# Patient Record
Sex: MALE | Race: WHITE | Employment: FULL TIME | URBAN - METROPOLITAN AREA
[De-identification: names, ages, dates, MRNs, and addresses within clinical notes are randomized per-mention and may not be internally consistent; named-entity substitution may affect disease eponyms.]

---

## 2020-05-09 ENCOUNTER — HOSPITAL ENCOUNTER (EMERGENCY)
Facility: CLINIC | Age: 65
Discharge: HOME | End: 2020-05-09
Attending: EMERGENCY MEDICINE
Payer: MEDICARE

## 2020-05-09 ENCOUNTER — APPOINTMENT (OUTPATIENT)
Dept: GENERAL RADIOLOGY | Facility: CLINIC | Age: 65
End: 2020-05-09
Payer: MEDICARE

## 2020-05-09 VITALS
RESPIRATION RATE: 18 BRPM | DIASTOLIC BLOOD PRESSURE: 82 MMHG | SYSTOLIC BLOOD PRESSURE: 140 MMHG | TEMPERATURE: 98.2 F | HEIGHT: 66 IN | HEART RATE: 77 BPM | BODY MASS INDEX: 33.75 KG/M2 | WEIGHT: 210 LBS | OXYGEN SATURATION: 99 %

## 2020-05-09 PROCEDURE — 96372 THER/PROPH/DIAG INJ SC/IM: CPT

## 2020-05-09 PROCEDURE — 99284 EMERGENCY DEPT VISIT MOD MDM: CPT

## 2020-05-09 PROCEDURE — 6360000002 HC RX W HCPCS: Performed by: EMERGENCY MEDICINE

## 2020-05-09 PROCEDURE — 71045 X-RAY EXAM CHEST 1 VIEW: CPT

## 2020-05-09 RX ORDER — KETOROLAC TROMETHAMINE 10 MG/1
10 TABLET, FILM COATED ORAL EVERY 6 HOURS PRN
Qty: 20 TABLET | Refills: 0 | Status: SHIPPED | OUTPATIENT
Start: 2020-05-09

## 2020-05-09 RX ORDER — OMEPRAZOLE 20 MG/1
20 CAPSULE, DELAYED RELEASE ORAL DAILY
COMMUNITY

## 2020-05-09 RX ORDER — METOPROLOL TARTRATE 50 MG/1
50 TABLET, FILM COATED ORAL 2 TIMES DAILY
COMMUNITY

## 2020-05-09 RX ORDER — PREDNISONE 50 MG/1
50 TABLET ORAL DAILY
Qty: 4 TABLET | Refills: 0 | Status: SHIPPED | OUTPATIENT
Start: 2020-05-09 | End: 2020-05-13

## 2020-05-09 RX ORDER — ACETAMINOPHEN 500 MG
1000 TABLET ORAL DAILY
COMMUNITY

## 2020-05-09 RX ORDER — KETOROLAC TROMETHAMINE 30 MG/ML
30 INJECTION, SOLUTION INTRAMUSCULAR; INTRAVENOUS ONCE
Status: COMPLETED | OUTPATIENT
Start: 2020-05-09 | End: 2020-05-09

## 2020-05-09 RX ADMIN — KETOROLAC TROMETHAMINE 30 MG: 30 INJECTION, SOLUTION INTRAMUSCULAR at 17:05

## 2020-05-09 ASSESSMENT — PAIN SCALES - GENERAL
PAINLEVEL_OUTOF10: 3
PAINLEVEL_OUTOF10: 3
PAINLEVEL_OUTOF10: 8

## 2020-05-09 ASSESSMENT — ENCOUNTER SYMPTOMS
COUGH: 1
DIARRHEA: 0
VOMITING: 0
SHORTNESS OF BREATH: 0
SORE THROAT: 0

## 2020-05-09 ASSESSMENT — PAIN DESCRIPTION - LOCATION
LOCATION: HEAD
LOCATION: HEAD

## 2020-05-09 ASSESSMENT — PAIN DESCRIPTION - DESCRIPTORS: DESCRIPTORS: ACHING

## 2020-05-09 ASSESSMENT — PAIN DESCRIPTION - PAIN TYPE
TYPE: CHRONIC PAIN
TYPE: CHRONIC PAIN

## 2020-05-09 NOTE — ED PROVIDER NOTES
[facility]  [facility]  Phone: [facility]  EMERGENCY DEPARTMENT ENCOUNTER      Pt Name: Jack Schmitt  MRN: [id number]  [place] 1955  Date of evaluation: 5/9/2020  Provider: Yaakov Peña DO    CHIEF COMPLAINT       Chief Complaint   Patient presents with    Fatigue    Headache     3-4 days          HISTORY OF PRESENT ILLNESS   (Location/Symptom, Timing/Onset,Context/Setting, Quality, Duration, Modifying Factors, Severity)  Note limiting factors. Jack Schmitt is a 60 y.o. male who presents to the emergency department for the evaluation of not feeling well. Patient states it has been at least the last few days where he has been having fatigue and feeling \"headachey\". States that he drives 3 different vehicles and feels like he inhales a lot of exhaust.  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 Patient does have some concern about this, he is also had a mild intermittent cough that is nonproductive, no fever. Patient states that occasionally when he coughs he has some pain laterally on each side of the chest.  No central chest pain, no exertional chest pain and no shortness of breath. Patient does not smoke cigarettes. Patient states no abdominal pain, vomiting or diarrhea, does have a good appetite. Nursing Notes were reviewed. REVIEW OF SYSTEMS    (2-9systems for level 4, 10 or more for level 5)     Review of Systems   Constitutional: Negative for fever. HENT: Negative for sore throat. Respiratory: Positive for cough. Negative for shortness of breath. Cardiovascular: Negative for chest pain. Gastrointestinal: Negative for diarrhea and vomiting. Genitourinary: Negative for dysuria. Skin: Negative for rash. Neurological: Positive for headaches. Negative for weakness. All other systems reviewed and are negative.       Except asnoted above the remainder of the review of systems was reviewed and negative. PAST MEDICAL HISTORY     Past Medical History:   Diagnosis Date    Hypertension          SURGICAL HISTORY       Past Surgical History:   Procedure Laterality Date    PACEMAKER PLACEMENT      SHOULDER SURGERY           CURRENT MEDICATIONS     Previous Medications    ACETAMINOPHEN (TYLENOL) 500 MG TABLET    Take 1,000 mg by mouth daily    METOPROLOL TARTRATE (LOPRESSOR) 50 MG TABLET    Take 50 mg by mouth 2 times daily    OMEPRAZOLE (PRILOSEC) 20 MG DELAYED RELEASE CAPSULE    Take 20 mg by mouth daily    SPIRONOLACTONE-HCTZ PO    Take by mouth Dose unknown       ALLERGIES     Demerol hcl [meperidine] and Morphine    FAMILY HISTORY     History reviewed. No pertinent family history.        SOCIAL HISTORY       Social History     Socioeconomic History    Marital status: Single     Spouse name: None    Number of children: None    Years of education: None    Highest education level: None   Occupational History    None   Social Needs    Financial resource strain: None    Food insecurity     Worry: None     Inability: None    Transportation needs     Medical: None     Non-medical: None   Tobacco Use    Smoking status: None   Substance and Sexual Activity    Alcohol use: None    Drug use: None    Sexual activity: None   Lifestyle    Physical activity     Days per week: None     Minutes per session: None    Stress: None   Relationships    Social connections     Talks on phone: None     Gets together: None     Attends Yarsanism service: None     Active member of club or organization: None     Attends meetings of clubs or organizations: None     Relationship status: None    Intimate partner violence     Fear of current or ex partner: None     Emotionally abused: None     Physically abused: None     Forced sexual activity: None   Other Topics Concern    None   Social History Narrative    None       SCREENINGS             PHYSICAL EXAM    (up to 7 for level 4, DO Don         PROCEDURES:  Unless otherwise noted below, none     Procedures    FINAL IMPRESSION      1.  Cough    2. Other headache syndrome          DISPOSITION/PLAN   DISPOSITION Decision To Discharge 05/09/2020 04:43:54 PM      PATIENT REFERRED TO:  JULIANNA Copeland [facility]  [address] [place]  [phone]    In 1 week        DISCHARGE MEDICATIONS:  New Prescriptions    KETOROLAC (TORADOL) 10 MG TABLET    Take 1 tablet by mouth every 6 hours as needed for Pain    PREDNISONE (DELTASONE) 50 MG TABLET    Take 1 tablet by mouth daily for 4 days          (Please note that portions of this note were completed with a voice recognition program.  Efforts were made to edit the dictations but occasionally words are mis-transcribed.)    Jelani Moses DO (electronically signed)  Board Certified Emergency Physician         Jelani Moses DO  05/09/20 [address] [facility]   05/09/20 [id number]

## 2020-05-11 ENCOUNTER — CARE COORDINATION (OUTPATIENT)
Dept: CARE COORDINATION | Age: 65
End: 2020-05-11

## 2020-05-11 NOTE — CARE COORDINATION
Risa Novoa was seen at [facility] ER 2020- Fatigue and HA. He was given Toradol and Prednisone. Advised to f/u within 1 week with PCP.     2020- 10:11 am- Left message requesting return call. 2020- Spoke with Risa Novoa. He stated he is a lot better. Patient contacted regarding recent discharge and COVID-19 risk   Care Transition Nurse/ Ambulatory Care Manager contacted the patient by telephone to perform post discharge assessment. Verified name and  with patient as identifiers. Patient has following risk factors of: no known risk factors. CTN/ACM reviewed discharge instructions, medical action plan and red flags related to discharge diagnosis. Reviewed and educated them on any new and changed medications related to discharge diagnosis. Advised obtaining a 90-day supply of all daily and as-needed medications. Education provided regarding infection prevention, and signs and symptoms of COVID-19 and when to seek medical attention with patient who verbalized understanding. Discussed exposure protocols and quarantine from [address] you at higher risk for severe illness  and given an opportunity for questions and concerns. The patient agrees to contact the COVID-19 hotline [phone] or PCP office for questions related to their healthcare. CTN/ACM provided contact information for future reference. From CDC: Are you at higher risk for severe illness?  Wash your hands often.  Avoid close contact (6 feet, which is about two arm lengths) with people who are sick.  Put distance between yourself and other people if COVID-19 is spreading in your community.  Clean and disinfect frequently touched surfaces.  Avoid all cruise travel and non-essential air travel.  Call your healthcare professional if you have concerns about COVID-19 and your underlying condition or if you are sick.     For more information on steps you can take to protect yourself, see CDC's How to Protect

## 2020-05-22 ENCOUNTER — CARE COORDINATION (OUTPATIENT)
Dept: CARE COORDINATION | Age: 65
End: 2020-05-22

## 2021-03-04 ENCOUNTER — APPOINTMENT (OUTPATIENT)
Dept: CT IMAGING | Facility: CLINIC | Age: 66
End: 2021-03-04
Payer: MEDICARE

## 2021-03-04 ENCOUNTER — HOSPITAL ENCOUNTER (EMERGENCY)
Facility: CLINIC | Age: 66
Discharge: HOME | End: 2021-03-04
Attending: EMERGENCY MEDICINE
Payer: MEDICARE

## 2021-03-04 ENCOUNTER — APPOINTMENT (OUTPATIENT)
Dept: GENERAL RADIOLOGY | Facility: CLINIC | Age: 66
End: 2021-03-04
Payer: MEDICARE

## 2021-03-04 ENCOUNTER — APPOINTMENT (OUTPATIENT)
Dept: ULTRASOUND IMAGING | Facility: CLINIC | Age: 66
End: 2021-03-04
Payer: MEDICARE

## 2021-03-04 VITALS
BODY MASS INDEX: 32.95 KG/M2 | WEIGHT: 205 LBS | TEMPERATURE: 98.9 F | RESPIRATION RATE: 16 BRPM | SYSTOLIC BLOOD PRESSURE: 137 MMHG | HEIGHT: 66 IN | DIASTOLIC BLOOD PRESSURE: 84 MMHG | OXYGEN SATURATION: 97 % | HEART RATE: 67 BPM

## 2021-03-04 DIAGNOSIS — Z20.822 SUSPECTED COVID-19 VIRUS INFECTION: Primary | ICD-10-CM

## 2021-03-04 DIAGNOSIS — N50.89 SPERMATIC CORD MASS: ICD-10-CM

## 2021-03-04 LAB
ABSOLUTE EOS #: 0.1 K/UL (ref 0–0.4)
ABSOLUTE IMMATURE GRANULOCYTE: ABNORMAL K/UL (ref 0–0.3)
ABSOLUTE LYMPH #: 0.8 K/UL (ref 1–4.8)
ABSOLUTE MONO #: 0.9 K/UL (ref 0.1–1.2)
ALBUMIN SERPL-MCNC: 4.1 G/DL (ref 3.5–5.2)
ALBUMIN/GLOBULIN RATIO: 1.3 (ref 1–2.5)
ALP BLD-CCNC: 53 U/L (ref 40–129)
ALT SERPL-CCNC: 22 U/L (ref 5–41)
AMYLASE: 36 U/L (ref 28–100)
ANION GAP SERPL CALCULATED.3IONS-SCNC: 10 MMOL/L (ref 9–17)
AST SERPL-CCNC: 27 U/L
BASOPHILS # BLD: 1 % (ref 0–2)
BASOPHILS ABSOLUTE: 0.1 K/UL (ref 0–0.2)
BILIRUB SERPL-MCNC: 0.7 MG/DL (ref 0.3–1.2)
BUN BLDV-MCNC: 8 MG/DL (ref 8–23)
BUN/CREAT BLD: NORMAL (ref 9–20)
CALCIUM SERPL-MCNC: 9.1 MG/DL (ref 8.6–10.4)
CHLORIDE BLD-SCNC: 104 MMOL/L (ref 98–107)
CO2: 24 MMOL/L (ref 20–31)
CREAT SERPL-MCNC: 0.8 MG/DL (ref 0.7–1.2)
D-DIMER QUANTITATIVE: 0.71 MG/L FEU
DIFFERENTIAL TYPE: ABNORMAL
EOSINOPHILS RELATIVE PERCENT: 1 % (ref 1–4)
GFR AFRICAN AMERICAN: >60 ML/MIN
GFR NON-AFRICAN AMERICAN: >60 ML/MIN
GFR SERPL CREATININE-BSD FRML MDRD: NORMAL ML/MIN/{1.73_M2}
GFR SERPL CREATININE-BSD FRML MDRD: NORMAL ML/MIN/{1.73_M2}
GLUCOSE BLD-MCNC: 93 MG/DL (ref 70–99)
HCT VFR BLD CALC: 44.4 % (ref 41–53)
HEMOGLOBIN: 15.1 G/DL (ref 13.5–17.5)
IMMATURE GRANULOCYTES: ABNORMAL %
LIPASE: 19 U/L (ref 13–60)
LYMPHOCYTES # BLD: 14 % (ref 24–44)
MCH RBC QN AUTO: 30.1 PG (ref 26–34)
MCHC RBC AUTO-ENTMCNC: 33.9 G/DL (ref 31–37)
MCV RBC AUTO: 89 FL (ref 80–100)
MONOCYTES # BLD: 17 % (ref 2–11)
NRBC AUTOMATED: ABNORMAL PER 100 WBC
PDW BLD-RTO: 13.4 % (ref 12.5–15.4)
PLATELET # BLD: 166 K/UL (ref 140–450)
PLATELET ESTIMATE: ABNORMAL
PMV BLD AUTO: 9.1 FL (ref 6–12)
POTASSIUM SERPL-SCNC: 4 MMOL/L (ref 3.7–5.3)
RBC # BLD: 5 M/UL (ref 4.5–5.9)
RBC # BLD: ABNORMAL 10*6/UL
SEG NEUTROPHILS: 67 % (ref 36–66)
SEGMENTED NEUTROPHILS ABSOLUTE COUNT: 3.6 K/UL (ref 1.8–7.7)
SODIUM BLD-SCNC: 138 MMOL/L (ref 135–144)
TOTAL PROTEIN: 7.2 G/DL (ref 6.4–8.3)
TROPONIN INTERP: NORMAL
TROPONIN T: NORMAL NG/ML
TROPONIN, HIGH SENSITIVITY: 7 NG/L (ref 0–22)
WBC # BLD: 5.4 K/UL (ref 3.5–11)
WBC # BLD: ABNORMAL 10*3/UL

## 2021-03-04 PROCEDURE — 84484 ASSAY OF TROPONIN QUANT: CPT

## 2021-03-04 PROCEDURE — 76870 US EXAM SCROTUM: CPT

## 2021-03-04 PROCEDURE — 6370000000 HC RX 637 (ALT 250 FOR IP): Performed by: EMERGENCY MEDICINE

## 2021-03-04 PROCEDURE — 83690 ASSAY OF LIPASE: CPT

## 2021-03-04 PROCEDURE — 99285 EMERGENCY DEPT VISIT HI MDM: CPT

## 2021-03-04 PROCEDURE — 96374 THER/PROPH/DIAG INJ IV PUSH: CPT

## 2021-03-04 PROCEDURE — 85379 FIBRIN DEGRADATION QUANT: CPT

## 2021-03-04 PROCEDURE — 2580000003 HC RX 258: Performed by: EMERGENCY MEDICINE

## 2021-03-04 PROCEDURE — 82150 ASSAY OF AMYLASE: CPT

## 2021-03-04 PROCEDURE — 6360000004 HC RX CONTRAST MEDICATION: Performed by: EMERGENCY MEDICINE

## 2021-03-04 PROCEDURE — 93005 ELECTROCARDIOGRAM TRACING: CPT | Performed by: EMERGENCY MEDICINE

## 2021-03-04 PROCEDURE — 6360000002 HC RX W HCPCS: Performed by: EMERGENCY MEDICINE

## 2021-03-04 PROCEDURE — 74177 CT ABD & PELVIS W/CONTRAST: CPT

## 2021-03-04 PROCEDURE — 85025 COMPLETE CBC W/AUTO DIFF WBC: CPT

## 2021-03-04 PROCEDURE — 71260 CT THORAX DX C+: CPT

## 2021-03-04 PROCEDURE — U0003 INFECTIOUS AGENT DETECTION BY NUCLEIC ACID (DNA OR RNA); SEVERE ACUTE RESPIRATORY SYNDROME CORONAVIRUS 2 (SARS-COV-2) (CORONAVIRUS DISEASE [COVID-19]), AMPLIFIED PROBE TECHNIQUE, MAKING USE OF HIGH THROUGHPUT TECHNOLOGIES AS DESCRIBED BY CMS-2020-01-R: HCPCS

## 2021-03-04 PROCEDURE — 36415 COLL VENOUS BLD VENIPUNCTURE: CPT

## 2021-03-04 PROCEDURE — U0005 INFEC AGEN DETEC AMPLI PROBE: HCPCS

## 2021-03-04 PROCEDURE — 80053 COMPREHEN METABOLIC PANEL: CPT

## 2021-03-04 RX ORDER — ACETAMINOPHEN 325 MG/1
650 TABLET ORAL ONCE
Status: COMPLETED | OUTPATIENT
Start: 2021-03-04 | End: 2021-03-04

## 2021-03-04 RX ORDER — SODIUM CHLORIDE 0.9 % (FLUSH) 0.9 %
10 SYRINGE (ML) INJECTION PRN
Status: DISCONTINUED | OUTPATIENT
Start: 2021-03-04 | End: 2021-03-04 | Stop reason: HOSPADM

## 2021-03-04 RX ORDER — 0.9 % SODIUM CHLORIDE 0.9 %
70 INTRAVENOUS SOLUTION INTRAVENOUS ONCE
Status: COMPLETED | OUTPATIENT
Start: 2021-03-04 | End: 2021-03-04

## 2021-03-04 RX ORDER — DEXAMETHASONE 6 MG/1
6 TABLET ORAL DAILY
Qty: 5 TABLET | Refills: 0 | Status: SHIPPED | OUTPATIENT
Start: 2021-03-04 | End: 2021-03-09

## 2021-03-04 RX ORDER — KETOROLAC TROMETHAMINE 15 MG/ML
15 INJECTION, SOLUTION INTRAMUSCULAR; INTRAVENOUS ONCE
Status: COMPLETED | OUTPATIENT
Start: 2021-03-04 | End: 2021-03-04

## 2021-03-04 RX ORDER — 0.9 % SODIUM CHLORIDE 0.9 %
1000 INTRAVENOUS SOLUTION INTRAVENOUS ONCE
Status: COMPLETED | OUTPATIENT
Start: 2021-03-04 | End: 2021-03-04

## 2021-03-04 RX ADMIN — SODIUM CHLORIDE, PRESERVATIVE FREE 10 ML: 5 INJECTION INTRAVENOUS at 14:16

## 2021-03-04 RX ADMIN — IOPAMIDOL 75 ML: 755 INJECTION, SOLUTION INTRAVENOUS at 14:16

## 2021-03-04 RX ADMIN — SODIUM CHLORIDE 70 ML: 9 INJECTION, SOLUTION INTRAVENOUS at 14:16

## 2021-03-04 RX ADMIN — SODIUM CHLORIDE 1000 ML: 9 INJECTION, SOLUTION INTRAVENOUS at 13:31

## 2021-03-04 RX ADMIN — KETOROLAC TROMETHAMINE 15 MG: 15 INJECTION, SOLUTION INTRAMUSCULAR; INTRAVENOUS at 13:31

## 2021-03-04 RX ADMIN — ACETAMINOPHEN 650 MG: 325 TABLET ORAL at 15:10

## 2021-03-04 ASSESSMENT — PAIN DESCRIPTION - DESCRIPTORS: DESCRIPTORS: ACHING

## 2021-03-04 ASSESSMENT — PAIN SCALES - GENERAL
PAINLEVEL_OUTOF10: 3
PAINLEVEL_OUTOF10: 5
PAINLEVEL_OUTOF10: 6

## 2021-03-04 ASSESSMENT — PAIN DESCRIPTION - LOCATION: LOCATION: BACK

## 2021-03-04 NOTE — ED NOTES
Vanessa Ny MD at bedside for evaluation, to update patient on results and plan of care.       Contreras Gutierres RN  03/04/21 7284 Suture Removal: 14 days

## 2021-03-04 NOTE — ED PROVIDER NOTES
[address] [facility]      Pt Name: Calista Josue  MRN: [id number]  [place] 1955  Date of evaluation: 3/4/2021      CHIEF COMPLAINT       Chief Complaint   Patient presents with    Abdominal Pain    Fever    Cough    Headache         HISTORY OF PRESENT ILLNESS      The patient presents with abdominal pain, fever, cough and headache. His symptoms started about a day or so ago. The patient has a history of IBS. He was told to fill a prescription for an antibiotic for some foul-smelling diarrhea last week, but he has not picked up the prescription yet. He is not sure the name of the antibiotic. The patient thinks he could have COVID-19. He denies blood in his stool or emesis. He is having some epigastric discomfort. He has been taking his antiacids as directed. He said he led a little bit of pain in the left lower chest this morning as well. The patient is not having any diaphoresis. He reports no rash. He works as a . Nothing makes his symptoms better or worse otherwise. REVIEW OF SYSTEMS       All systems reviewed and negative unless noted in HPI. The patient reports a fever. Denies vision change. Denies any sore throat or rhinorrhea. Denies any neck pain or stiffness. Reports cough and pain in the left anterior chest this morning. Anuradha Libman Epigastric abdominal pain. History of IBS. Denies any dysuria. Denies urinary frequency or hematuria. Denies musculoskeletal injury or pain. Denies any weakness, numbness or focal neurologic deficit. Reports headache involving his whole head. Denies any skin rash or edema. No recent psychiatric issues. No easy bruising or bleeding. Denies any polyuria, polydypsia or history of immunocompromise. PAST MEDICAL HISTORY    has a past medical history of Hypertension.     SURGICAL HISTORY has a past surgical history that includes pacemaker placement and shoulder surgery. CURRENT MEDICATIONS       Previous Medications    ACETAMINOPHEN (TYLENOL) 500 MG TABLET    Take 1,000 mg by mouth daily    KETOROLAC (TORADOL) 10 MG TABLET    Take 1 tablet by mouth every 6 hours as needed for Pain    METOPROLOL TARTRATE (LOPRESSOR) 50 MG TABLET    Take 50 mg by mouth 2 times daily    OMEPRAZOLE (PRILOSEC) 20 MG DELAYED RELEASE CAPSULE    Take 20 mg by mouth daily    SPIRONOLACTONE-HCTZ PO    Take by mouth Dose unknown       ALLERGIES     is allergic to demerol hcl [meperidine] and morphine. FAMILY HISTORY     has no family status information on file. family history is not on file. SOCIAL HISTORY          PHYSICAL EXAM     INITIAL VITALS:  height is 5' 6\" (1.676 m) and weight is 93 kg (205 lb). His oral temperature is 98.9 °F (37.2 °C). His blood pressure is 149/85 (abnormal) and his pulse is 62. His respiration is 15 and oxygen saturation is 99%. The patient is alert and oriented, in no apparent distress. HEENT is atraumatic. Pupils are PERRL at 4 mm. Mucous membranes moist.    Neck is supple. Heart sounds regular rate and rhythm with no gallops, murmurs, or rubs. Pacemaker in anterior chest.  Lungs clear, no wheezes, rales or rhonchi. Abdomen: soft, nontender with no pain to palpation. No pulsatile mass. Normal bowel sounds are noted. No rebound or guarding. Musculoskeletal exam shows no evidence of trauma. Normal distal pulses in all extremities. Skin: no rash or edema. Neurological exam reveals cranial nerves 2 through 12 grossly intact. Patient has equal  and normal deep tendon reflexes. Psychiatric: no hallucinations or suicidal ideation. Lymphatics.:  No lymphadenopathy.          DIFFERENTIAL DIAGNOSIS/ MDM:     Covid 19, dehydration, ACS, pneumonia, PE, gastritis    DIAGNOSTIC RESULTS EKG: All EKG's are interpreted by the Emergency Department Physician who either signs or Co-signs this chart in the absence of a cardiologist.    Atrial paced at 66 with narrow complex and no ischemia. Axis -33, , QRS 84, . No old to compare. RADIOLOGY:   I reviewed the radiologist interpretations:  [place]   Final Result   Testicles are normal in appearance with normal Doppler flow. 2.2 x 1.0 x 1.4 cm irregular, hypoechoic soft tissue mass in the left   inguinal canal which appears to be associated with the spermatic cord. Imaging features are nonspecific, but do not appear to represent a spermatic   cord lipoma or hydrocele. Other differential considerations include   mesenchymal tumors, both benign and malignant. Consideration should be made   for contrast-enhanced MRI on a routine basis to attempt to further   characterize, and Urology consultation is advised. CT CHEST PULMONARY EMBOLISM W CONTRAST   Final Result   No acute or chronic pulmonary embolism. Bilateral atelectasis. CT ABDOMEN PELVIS W IV CONTRAST Additional Contrast? None   Final Result   2.1 x 1.5 cm soft tissue density structure in the left inguinal canal.  The   testicle is seen within the scrotum and there is no evidence for hernia. Scrotal ultrasound to include the left inguinal canal region is recommended   for further evaluation. Fatty infiltration of the liver. Prostatomegaly                US SCROTUM W LIMITED DUPLEX (Final result)  Result time 03/04/21 16:08:12  Procedure changed from [address]  Final result by Andrea Henriquez DO (03/04/21 16:08:12)                Impression:    Testicles are normal in appearance with normal Doppler flow. 2.2 x 1.0 x 1.4 cm irregular, hypoechoic soft tissue mass in the left   inguinal canal which appears to be associated with the spermatic cord. Imaging features are nonspecific, but do not appear to represent a spermatic   cord lipoma or hydrocele.  Other differential considerations include   mesenchymal tumors, both benign and malignant.  Consideration should be made   for contrast-enhanced MRI on a routine basis to attempt to further   characterize, and Urology consultation is advised. Narrative:    EXAMINATION:   ULTRASOUND OF THE SCROTUM/TESTICLES WITH COLOR DOPPLER FLOW EVALUATION     3/4/2021     COMPARISON:   CT performed earlier today     HISTORY:   ORDERING SYSTEM PROVIDED HISTORY: left inguinal mass   TECHNOLOGIST PROVIDED HISTORY:   left inguinal mass   Reason for Exam: Follow up to CT for abnormal findings, Left inguinal mass   Acuity: Unknown   Type of Exam: Subsequent/Follow-up     FINDINGS:     Measurements:     Right testicle: 4.4 x 2.6 x 2.9 cm     Right epididymal head: 0.7 cm     Left testicle: 4.3 x 2.5 x 3.0 cm     Left epididymal head: 0.8 cm a       Right:     Grey scale:  The right testicle demonstrates normal homogeneous echotexture   without focal lesion.  No evidence of testicular microlithiasis. Doppler Evaluation:  There is normal arterial and venous Doppler flow within   the testicle. Scrotal Sac:  No evidence of hydrocele. Epididymis:  No acute abnormality. Left:     Grey scale:  The left testicle demonstrates normal homogeneous echotexture   without focal lesion.  No evidence of testicular microlithiasis. Doppler Evaluation:  There is normal arterial and venous Doppler flow within   the testicle. Scrotal Sac:  No evidence of hydrocele, varicocele, or hernia.  2.2 x 1.0 x   1.4 cm irregular, hypoechoic soft tissue mass in the left inguinal canal with   internal vascularity corresponding to the finding on prior CT.      Epididymis:  No acute abnormality.                     CT CHEST PULMONARY EMBOLISM W CONTRAST (Final result)  Result time 03/04/21 14:42:43 Procedure changed from [address] [place] [address]  Final result by Kel Fabian MD (03/04/21 14:42:43)                Impression:    No acute or chronic pulmonary embolism. Bilateral atelectasis. Narrative:    EXAMINATION:   CTA OF THE CHEST 3/4/2021 2:09 pm     TECHNIQUE:   CTA of the chest was performed after the administration of intravenous   contrast.  Multiplanar reformatted images are provided for review.  MIP   images are provided for review. Dose modulation, iterative reconstruction,   and/or weight based adjustment of the mA/kV was utilized to reduce the   radiation dose to as low as reasonably achievable. COMPARISON:   None. HISTORY:   ORDERING SYSTEM PROVIDED HISTORY: pain; elevated d-dimer; covid   TECHNOLOGIST PROVIDED HISTORY:   pain; elevated d-dimer; covid   Reason for Exam: elevated D-Dimer   Acuity: Acute   Type of Exam: Initial   Additional signs and symptoms: cough, fever, headache, and epigastric   abdominal pain for the past couple days   Relevant Medical/Surgical History: hypertension and pacemaker placement     FINDINGS:   Pulmonary Arteries: Pulmonary arteries are adequately opacified for   evaluation.  No evidence of intraluminal filling defect to suggest pulmonary   embolism.  Main pulmonary artery is normal in caliber. Mediastinum: No evidence of mediastinal lymphadenopathy.  The heart and   pericardium demonstrate no acute abnormality.  There is no acute abnormality   of the thoracic aorta. Lungs/pleura: The lungs are without acute process.  There is bilateral   atelectasis.  No focal consolidation or pulmonary edema.  No evidence of   pleural effusion or pneumothorax. Upper Abdomen: Limited images of the upper abdomen are unremarkable.      Soft Tissues/Bones: No acute bone or soft tissue abnormality.                     CT ABDOMEN PELVIS W IV CONTRAST Additional Contrast? None (Final result)  Result time 03/04/21 14:54:37 Final result by Eva Hampton MD (03/04/21 14:54:37)                Impression:    2.1 x 1.5 cm soft tissue density structure in the left inguinal canal.  The   testicle is seen within the scrotum and there is no evidence for hernia. Scrotal ultrasound to include the left inguinal canal region is recommended   for further evaluation. Fatty infiltration of the liver. Prostatomegaly             Narrative:    EXAMINATION:   CT OF THE ABDOMEN AND PELVIS WITH CONTRAST 3/4/2021 2:10 pm     TECHNIQUE:   CT of the abdomen and pelvis was performed with the administration of   intravenous contrast. Multiplanar reformatted images are provided for review. Dose modulation, iterative reconstruction, and/or weight based adjustment of   the mA/kV was utilized to reduce the radiation dose to as low as reasonably   achievable. COMPARISON:   None. HISTORY:   ORDERING SYSTEM PROVIDED HISTORY: pain   TECHNOLOGIST PROVIDED HISTORY:   pain     Decision Support Exception->Emergency Medical Condition (MA)   Reason for Exam: epigastric abdominal pain   Acuity: Acute   Type of Exam: Initial   Additional signs and symptoms: cough, fever, and headache over the past   couple days   Relevant Medical/Surgical History: IBS     FINDINGS:   Lower Chest: Please refer to the chest CT report     Organs: There is mild fatty infiltration of the liver.  The gallbladder,   kidneys, spleen, pancreas, adrenal glands are normal     GI/Bowel: There is a moderate to large amount of stool seen throughout the   colon. Pelvis: The bladder is unremarkable in appearance.  The prostate gland is   enlarged measuring 6 x 5 cm in size. Peritoneum/Retroperitoneum: Calcific atherosclerotic disease in the aorta. No retroperitoneal lymphadenopathy is seen. Bones/Soft Tissues: There is a 2.1 x 1.5 cm soft tissue density structure in   the left inguinal region.                  LABS: Results for orders placed or performed during the hospital encounter of 03/04/21   CBC Auto Differential   Result Value Ref Range    WBC 5.4 3.5 - 11.0 k/uL    RBC 5.00 4.5 - 5.9 m/uL    Hemoglobin 15.1 13.5 - 17.5 g/dL    Hematocrit 44.4 41 - 53 %    MCV 89.0 80 - 100 fL    MCH 30.1 26 - 34 pg    MCHC 33.9 31 - 37 g/dL    RDW 13.4 12.5 - 15.4 %    Platelets 589 012 - 857 k/uL    MPV 9.1 6.0 - 12.0 fL    NRBC Automated NOT REPORTED per 100 WBC    Differential Type NOT REPORTED     Seg Neutrophils 67 (H) 36 - 66 %    Lymphocytes 14 (L) 24 - 44 %    Monocytes 17 (H) 2 - 11 %    Eosinophils % 1 1 - 4 %    Basophils 1 0 - 2 %    Immature Granulocytes NOT REPORTED 0 %    Segs Absolute 3.60 1.8 - 7.7 k/uL    Absolute Lymph # 0.80 (L) 1.0 - 4.8 k/uL    Absolute Mono # 0.90 0.1 - 1.2 k/uL    Absolute Eos # 0.10 0.0 - 0.4 k/uL    Basophils Absolute 0.10 0.0 - 0.2 k/uL    Absolute Immature Granulocyte NOT REPORTED 0.00 - 0.30 k/uL    WBC Morphology NOT REPORTED     RBC Morphology NOT REPORTED     Platelet Estimate NOT REPORTED    Comprehensive Metabolic Panel   Result Value Ref Range    Glucose 93 70 - 99 mg/dL    BUN 8 8 - 23 mg/dL    CREATININE 0.80 0.70 - 1.20 mg/dL    Bun/Cre Ratio NOT REPORTED 9 - 20    Calcium 9.1 8.6 - 10.4 mg/dL    Sodium 138 135 - 144 mmol/L    Potassium 4.0 3.7 - 5.3 mmol/L    Chloride 104 98 - 107 mmol/L    CO2 24 20 - 31 mmol/L    Anion Gap 10 9 - 17 mmol/L    Alkaline Phosphatase 53 40 - 129 U/L    ALT 22 5 - 41 U/L    AST 27 <40 U/L    Total Bilirubin 0.70 0.3 - 1.2 mg/dL    Total Protein 7.2 6.4 - 8.3 g/dL    Albumin 4.1 3.5 - 5.2 g/dL    Albumin/Globulin Ratio 1.3 1.0 - 2.5    GFR Non-African American >60 >60 mL/min    GFR African American >60 >60 mL/min    GFR Comment          GFR Staging NOT REPORTED    Lipase   Result Value Ref Range    Lipase 19 13 - 60 U/L   Amylase   Result Value Ref Range    Amylase 36 28 - 100 U/L   Troponin   Result Value Ref Range Troponin, High Sensitivity 7 0 - 22 ng/L    Troponin T NOT REPORTED <0.03 ng/mL    Troponin Interp NOT REPORTED    D-Dimer, Quantitative   Result Value Ref Range    D-Dimer, Quant 0.71 mg/L FEU         EMERGENCY DEPARTMENT COURSE:   Vitals:    Vitals:    03/04/21 1312 03/04/21 1335 03/04/21 1444   BP: (!) 162/87 138/78 (!) 149/85   Pulse: 72 79 62   Resp: 20 18 15   Temp: 98.9 °F (37.2 °C)     TempSrc: Oral     SpO2: 99% 96% 99%   Weight: 93 kg (205 lb)     Height: 5' 6\" (1.676 m)       -------------------------  BP: (!) 149/85, Temp: 98.9 °F (37.2 °C), Pulse: 62, Resp: 15      Re-evaluation Notes    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 I explained that he does need to get this checked out to make sure is not a cancerous growth. I have given him referral to the urologist.  The patient likely has a COVID-19 infection. He is advised to isolate at home. A nonrapid test was obtained. The patient has no evidence of ACS, PE, or pneumonia. He has no surgical GI presentation. He is discharged in good condition. FINAL IMPRESSION      1. Suspected COVID-19 virus infection    2.  Spermatic cord mass          DISPOSITION/PLAN   DISPOSITION        Condition on Disposition    good    PATIENT REFERRED TO:  Emily Kowalski MD  [address]  [address] [facility] [address] [address]  [phone]    In 2 days        DISCHARGE MEDICATIONS:  New Prescriptions    No medications on file       (Please note that portions of this note were completed with a voice recognition program.  Efforts were made to edit the dictations but occasionally words are mis-transcribed.)    Raza MD   Attending Emergency Physician       Floyd Ash MD  03/04/21 4920

## 2021-03-05 ENCOUNTER — CARE COORDINATION (OUTPATIENT)
Dept: FAMILY MEDICINE CLINIC | Age: 66
End: 2021-03-05

## 2021-03-05 LAB
EKG ATRIAL RATE: 66 BPM
EKG P AXIS: 25 DEGREES
EKG P-R INTERVAL: 150 MS
EKG Q-T INTERVAL: 368 MS
EKG QRS DURATION: 84 MS
EKG QTC CALCULATION (BAZETT): 385 MS
EKG R AXIS: -33 DEGREES
EKG T AXIS: 5 DEGREES
EKG VENTRICULAR RATE: 66 BPM
SARS-COV-2: ABNORMAL
SARS-COV-2: DETECTED
SOURCE: ABNORMAL

## 2021-03-05 NOTE — CARE COORDINATION
ACM attempted outreach for ED follow up, viral symptoms, COVID 19 Protocol    No answer and LVM requesting call back for updates, COVID 19 prevention/precaution    COVID 19 testing +

## 2021-07-24 ENCOUNTER — HOSPITAL ENCOUNTER (EMERGENCY)
Facility: CLINIC | Age: 66
Discharge: HOME | End: 2021-07-24
Attending: EMERGENCY MEDICINE
Payer: MEDICARE

## 2021-07-24 VITALS
SYSTOLIC BLOOD PRESSURE: 153 MMHG | RESPIRATION RATE: 14 BRPM | OXYGEN SATURATION: 98 % | BODY MASS INDEX: 32.14 KG/M2 | DIASTOLIC BLOOD PRESSURE: 100 MMHG | WEIGHT: 200 LBS | HEIGHT: 66 IN | HEART RATE: 71 BPM | TEMPERATURE: 98.1 F

## 2021-07-24 DIAGNOSIS — A60.02 HERPESVIRAL INFECTION OF OTHER MALE GENITAL ORGANS: ICD-10-CM

## 2021-07-24 DIAGNOSIS — N39.0 URINARY TRACT INFECTION WITHOUT HEMATURIA, SITE UNSPECIFIED: Primary | ICD-10-CM

## 2021-07-24 LAB
-: ABNORMAL
AMORPHOUS: ABNORMAL
BACTERIA: ABNORMAL
BILIRUBIN URINE: NEGATIVE
CASTS UA: ABNORMAL /LPF (ref 0–2)
COLOR: YELLOW
COMMENT UA: ABNORMAL
CRYSTALS, UA: ABNORMAL /HPF
EPITHELIAL CELLS UA: ABNORMAL /HPF (ref 0–5)
GLUCOSE URINE: NEGATIVE
KETONES, URINE: NEGATIVE
LEUKOCYTE ESTERASE, URINE: ABNORMAL
MUCUS: ABNORMAL
NITRITE, URINE: NEGATIVE
OTHER OBSERVATIONS UA: ABNORMAL
PH UA: 5.5 (ref 5–8)
PROTEIN UA: NEGATIVE
RBC UA: ABNORMAL /HPF (ref 0–2)
RENAL EPITHELIAL, UA: ABNORMAL /HPF
SPECIFIC GRAVITY UA: 1.01 (ref 1–1.03)
TRICHOMONAS: ABNORMAL
TURBIDITY: ABNORMAL
URINE HGB: ABNORMAL
UROBILINOGEN, URINE: NORMAL
WBC UA: ABNORMAL /HPF (ref 0–5)
YEAST: ABNORMAL

## 2021-07-24 PROCEDURE — 99282 EMERGENCY DEPT VISIT SF MDM: CPT

## 2021-07-24 PROCEDURE — 81001 URINALYSIS AUTO W/SCOPE: CPT

## 2021-07-24 PROCEDURE — 87086 URINE CULTURE/COLONY COUNT: CPT

## 2021-07-24 RX ORDER — LEVOFLOXACIN 500 MG/1
500 TABLET, FILM COATED ORAL DAILY
Qty: 10 TABLET | Refills: 0 | Status: SHIPPED | OUTPATIENT
Start: 2021-07-24 | End: 2021-08-03

## 2021-07-24 RX ORDER — VALACYCLOVIR HYDROCHLORIDE 1 G/1
1000 TABLET, FILM COATED ORAL 2 TIMES DAILY
Qty: 20 TABLET | Refills: 0 | Status: SHIPPED | OUTPATIENT
Start: 2021-07-24 | End: 2021-08-03

## 2021-07-24 ASSESSMENT — PAIN SCALES - GENERAL: PAINLEVEL_OUTOF10: 2

## 2021-07-24 ASSESSMENT — PAIN DESCRIPTION - LOCATION: LOCATION: HEAD

## 2021-07-24 ASSESSMENT — PAIN DESCRIPTION - FREQUENCY: FREQUENCY: CONTINUOUS

## 2021-07-24 NOTE — ED PROVIDER NOTES
[address] [place] [address] ED  15 [facility]  Phone: 50 Rak[address] Squires      Pt Name: Luis M Aly  MRN: [id number]  [place] 1955  Date of evaluation: 7/24/2021    CHIEF COMPLAINT       Chief Complaint   Patient presents with    Urinary Tract Infection     has had them in the past        HISTORY OF PRESENT ILLNESS    Luis M Aly is a 73 y.o. male who presents with a recurrence of a urinary tract infection. The patient states that he is recently seen a urologist in the urologist called him today he does state that his urine was positive for infection and urged him to come to the emergency department. Seen by [facility]. Patient also states that he has an area of herpes involving his scrotum that he states sometimes flares up and is requesting that I make an exam of this. REVIEW OF SYSTEMS     Constitutional: No fevers or chills   HEENT: No sore throat, rhinorrhea, or earache   Eyes: No blurry vision or double vision no drainage   Cardiovascular: No chest pain or tachycardia   Respiratory: No wheezing or shortness of breath no cough   Gastrointestinal: No nausea, vomiting, diarrhea, constipation, or abdominal pain   : See above  Musculoskeletal: No swelling or pain   Skin: No rash   Neurological: No focal neurologic complaints, paresthesias, weakness, or headache   PAST MEDICAL HISTORY    has a past medical history of Hypertension. SURGICAL HISTORY      has a past surgical history that includes pacemaker placement; shoulder surgery; and knee surgery.     CURRENT MEDICATIONS       Previous Medications    ACETAMINOPHEN (TYLENOL) 500 MG TABLET    Take 1,000 mg by mouth daily    KETOROLAC (TORADOL) 10 MG TABLET    Take 1 tablet by mouth every 6 hours as needed for Pain    METOPROLOL TARTRATE (LOPRESSOR) 50 MG TABLET    Take 50 mg by mouth 2 times daily    OMEPRAZOLE (PRILOSEC) 20 MG DELAYED RELEASE CAPSULE    Take 20 mg by mouth daily SPIRONOLACTONE-HCTZ PO    Take by mouth Dose unknown       ALLERGIES     is allergic to demerol hcl [meperidine] and morphine. FAMILY HISTORY     has no family status information on file. family history is not on file. SOCIAL HISTORY      reports that he has never smoked. He has never used smokeless tobacco. He reports previous drug use. Drug: Marijuana. He reports that he does not drink alcohol. PHYSICAL EXAM       ED Triage Vitals   BP Temp Temp src Pulse Resp SpO2 Height Weight   -- -- -- -- -- -- -- --     Constitutional: Alert, oriented x3, nontoxic, answering questions appropriately, acting properly for age, in no acute distress   HEENT: Extraocular muscles intact, mucus membranes moist, TMs clear bilaterally, no posterior pharyngeal erythema or exudates, Pupils equal, round, reactive to light,   Neck: Trachea midline   Cardiovascular: Regular rhythm and rate no S3, S4, or murmurs   Respiratory: Clear to auscultation bilaterally no wheezes, rhonchi, rales, no respiratory distress no tachypnea no retractions no hypoxia  Gastrointestinal: Soft, nontender, nondistended, positive bowel sounds. No rebound, rigidity, or guarding.    Musculoskeletal: No extremity pain or swelling   Neurologic: Moving all 4 extremities without difficulty there are no gross focal neurologic deficits   Skin: Warm and dry   DIFFERENTIAL DIAGNOSIS/ MDM:         DIAGNOSTIC RESULTS     EKG: All EKG's are interpreted by the Emergency Department Physician who either signs or Co-signs this chart in the absence of a cardiologist.        Not indicated unless otherwise documented above    LABS:  Results for orders placed or performed during the hospital encounter of 07/24/21   Urinalysis Reflex to Culture    Specimen: Urine, clean catch   Result Value Ref Range    Color, UA YELLOW YELLOW    Turbidity UA SLIGHTLY CLOUDY (A) CLEAR    Glucose, Ur NEGATIVE NEGATIVE    Bilirubin Urine NEGATIVE NEGATIVE    Ketones, Urine NEGATIVE NEGATIVE Specific Gravity, UA 1.010 1.005 - 1.030    Urine Hgb LARGE (A) NEGATIVE    pH, UA 5.5 5.0 - 8.0    Protein, UA NEGATIVE NEGATIVE    Urobilinogen, Urine Normal Normal    Nitrite, Urine NEGATIVE NEGATIVE    Leukocyte Esterase, Urine MODERATE (A) NEGATIVE    Urinalysis Comments NOT REPORTED    Microscopic Urinalysis   Result Value Ref Range    -          WBC, UA TOO NUMEROUS TO COUNT 0 - 5 /HPF    RBC, UA 50  0 - 2 /HPF    Casts UA NOT REPORTED 0 - 2 /LPF    Crystals, UA NOT REPORTED None /HPF    Epithelial Cells UA 0 TO 2 0 - 5 /HPF    Renal Epithelial, UA NOT REPORTED 0 /HPF    Bacteria, UA FEW (A) None    Mucus, UA NOT REPORTED None    Trichomonas, UA NOT REPORTED None    Amorphous, UA NOT REPORTED None    Other Observations UA Culture ordered based on defined criteria. (A) NOT REQ. Yeast, UA NOT REPORTED None       Not indicated unless otherwise documented above    RADIOLOGY:   I reviewed the radiologist interpretations:    No orders to display       Not indicated unless otherwise documented above    EMERGENCY DEPARTMENT COURSE:     The patient was given the following medications:  Orders Placed This Encounter   Medications    levoFLOXacin (LEVAQUIN) 500 MG tablet     Sig: Take 1 tablet by mouth daily for 10 days     Dispense:  10 tablet     Refill:  0    valACYclovir (VALTREX) 1 g tablet     Sig: Take 1 tablet by mouth 2 times daily for 10 days     Dispense:  20 tablet     Refill:  0        Vitals:   -------------------------  BP (!) 153/100   Pulse 71   Temp 98.1 °F (36.7 °C) (Oral)   Resp 14   Ht 5' 6\" (1.676 m)   Wt 90.7 kg (200 lb)   SpO2 98%   BMI 32.28 kg/m²         I have reviewed the disposition diagnosis with the patient and or their family/guardian. I have answered their questions and given discharge instructions. They voiced understanding of these instructions and did not have any furtherquestions or complaints.     CRITICAL CARE:    None    CONSULTS:    None    PROCEDURES:    None      OARRS Report if indicated             FINAL IMPRESSION      1. Urinary tract infection without hematuria, site unspecified    2.  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  Efforts were made to edit the dictations but occasionally words are mis-transcribed.)    Arturo Quintero MD,, MD  Attending Emergency Physician        Arturo Quintero MD  07/24/21 3012

## 2021-07-25 LAB
CULTURE: NORMAL
Lab: NORMAL
SPECIMEN DESCRIPTION: NORMAL

## 2021-12-10 ENCOUNTER — HOSPITAL ENCOUNTER (EMERGENCY)
Facility: CLINIC | Age: 66
Discharge: HOME | End: 2021-12-10
Attending: EMERGENCY MEDICINE
Payer: MEDICARE

## 2021-12-10 VITALS
BODY MASS INDEX: 31.02 KG/M2 | HEIGHT: 66 IN | DIASTOLIC BLOOD PRESSURE: 83 MMHG | RESPIRATION RATE: 16 BRPM | SYSTOLIC BLOOD PRESSURE: 146 MMHG | HEART RATE: 81 BPM | OXYGEN SATURATION: 98 % | TEMPERATURE: 98.1 F | WEIGHT: 193 LBS

## 2021-12-10 DIAGNOSIS — J06.9 ACUTE UPPER RESPIRATORY INFECTION: Primary | ICD-10-CM

## 2021-12-10 DIAGNOSIS — Z20.822 SUSPECTED COVID-19 VIRUS INFECTION: ICD-10-CM

## 2021-12-10 LAB
DIRECT EXAM: NORMAL
DIRECT EXAM: NORMAL
Lab: NORMAL
Lab: NORMAL
SPECIMEN DESCRIPTION: NORMAL
SPECIMEN DESCRIPTION: NORMAL

## 2021-12-10 PROCEDURE — U0005 INFEC AGEN DETEC AMPLI PROBE: HCPCS

## 2021-12-10 PROCEDURE — U0003 INFECTIOUS AGENT DETECTION BY NUCLEIC ACID (DNA OR RNA); SEVERE ACUTE RESPIRATORY SYNDROME CORONAVIRUS 2 (SARS-COV-2) (CORONAVIRUS DISEASE [COVID-19]), AMPLIFIED PROBE TECHNIQUE, MAKING USE OF HIGH THROUGHPUT TECHNOLOGIES AS DESCRIBED BY CMS-2020-01-R: HCPCS

## 2021-12-10 PROCEDURE — 99283 EMERGENCY DEPT VISIT LOW MDM: CPT

## 2021-12-10 PROCEDURE — 87880 STREP A ASSAY W/OPTIC: CPT

## 2021-12-10 PROCEDURE — 87804 INFLUENZA ASSAY W/OPTIC: CPT

## 2021-12-10 RX ORDER — LORATADINE 10 MG
1 CAPSULE ORAL EVERY 4 HOURS PRN
Qty: 30 CAPSULE | Refills: 0 | Status: SHIPPED | OUTPATIENT
Start: 2021-12-10

## 2021-12-10 RX ORDER — PREDNISONE 10 MG/1
TABLET ORAL
Qty: 20 TABLET | Refills: 0 | Status: SHIPPED | OUTPATIENT
Start: 2021-12-10 | End: 2021-12-20

## 2021-12-10 ASSESSMENT — PAIN DESCRIPTION - PAIN TYPE: TYPE: ACUTE PAIN

## 2021-12-10 ASSESSMENT — PAIN DESCRIPTION - FREQUENCY: FREQUENCY: CONTINUOUS

## 2021-12-10 ASSESSMENT — PAIN DESCRIPTION - ONSET: ONSET: ON-GOING

## 2021-12-10 ASSESSMENT — PAIN SCALES - GENERAL: PAINLEVEL_OUTOF10: 3

## 2021-12-10 ASSESSMENT — PAIN DESCRIPTION - LOCATION: LOCATION: HEAD

## 2021-12-10 ASSESSMENT — PAIN DESCRIPTION - PROGRESSION: CLINICAL_PROGRESSION: NOT CHANGED

## 2021-12-10 ASSESSMENT — PAIN DESCRIPTION - DESCRIPTORS: DESCRIPTORS: ACHING

## 2021-12-10 NOTE — ED TRIAGE NOTES
Pt to ED with c/o a headache and a sore throat. Pt reports these symptoms started this past Monday or Tuesday. Pt denies any other complaints at this time. Pt sitting in chair. RR even and non labored. NAD noted at this time. Call light within reach.

## 2021-12-10 NOTE — ED PROVIDER NOTES
[address] [facility]      Pt Name: Bartolo Mckenzie  MRN: [id number]  [place] 1955  Date of evaluation: 12/10/2021      CHIEF COMPLAINT       Chief Complaint   Patient presents with    Pharyngitis     PCP would not prescribe anything until tested for covid.  Headache         HISTORY OF PRESENT ILLNESS      The patient presents with sore throat and headache for the past week. The patient called his PCP but they would not prescribe anything without a Covid test.  The patient reports cough productive of clear sputum. He cannot take a lot of decongestants because of his hypertension. He reports throat discomfort but he is able to drink fluids and to breathe. He does not have a history of smoking. He is not immunized against Covid. The patient denies vomiting or diarrhea. He has had some intermittent chills but no fever. Nothing makes his symptoms better or worse otherwise. REVIEW OF SYSTEMS       All systems reviewed and negative unless noted in HPI. The patient reports occasional chills. Denies vision change. Patient reports sore throat. .    Denies any neck pain or stiffness. Denies chest pain or shortness of breath. Cough occasionally productive of clear sputum. No nausea,  vomiting or diarrhea. Denies any dysuria. Denies urinary frequency or hematuria. Denies musculoskeletal injury or pain. Denies any weakness, numbness or focal neurologic deficit. Reports I headache in his whole head. Denies any skin rash or edema. No recent psychiatric issues. No easy bruising or bleeding. Denies any polyuria, polydypsia or history of immunocompromise. PAST MEDICAL HISTORY    has a past medical history of Hypertension. SURGICAL HISTORY      has a past surgical history that includes pacemaker placement; shoulder surgery; knee surgery; and Colonoscopy.     CURRENT MEDICATIONS       Discharge Medication List as of 12/10/2021 6:56 PM      CONTINUE these medications which have NOT CHANGED    Details   SPIRONOLACTONE-HCTZ PO Take by mouth Dose unknownHistorical Med      metoprolol tartrate (LOPRESSOR) 50 MG tablet Take 50 mg by mouth 2 times dailyHistorical Med      omeprazole (PRILOSEC) 20 MG delayed release capsule Take 20 mg by mouth dailyHistorical Med      acetaminophen (TYLENOL) 500 MG tablet Take 1,000 mg by mouth dailyHistorical Med      ketorolac (TORADOL) 10 MG tablet Take 1 tablet by mouth every 6 hours as needed for Pain, Disp-20 tablet, R-0Print             ALLERGIES     is allergic to demerol hcl [meperidine] and morphine. FAMILY HISTORY     has no family status information on file. family history is not on file. SOCIAL HISTORY      reports that he has never smoked. He has never used smokeless tobacco. He reports previous drug use. Drug: Marijuana Berneta Marquis). He reports that he does not drink alcohol. PHYSICAL EXAM     INITIAL VITALS:  height is 5' 6\" (1.676 m) and weight is 87.5 kg (193 lb). His oral temperature is 98.1 °F (36.7 °C). His blood pressure is 146/83 (abnormal) and his pulse is 81. His respiration is 16 and oxygen saturation is 98%. The patient is alert and oriented, in no apparent distress. HEENT is atraumatic. Pupils are PERRL at 4 mm with normal extraocular motion. Mucous membranes moist.  Posterior pharynx shows minimal erythema. Neck is supple with no lymphadenopathy. No meningismus. Heart sounds regular rate and rhythm with no gallops, murmurs, or rubs. Lungs clear, no wheezes, rales or rhonchi. Abdomen: soft, nontender with no pain to palpation. Musculoskeletal exam shows no evidence of trauma. Normal distal pulses in all extremities. Skin: no rash or edema. Neurological exam reveals cranial nerves 2 through 12 grossly intact. Patient has equal  and normal deep tendon reflexes. Psychiatric: no hallucinations or suicidal ideation.    Lymphatics.:  No lymphadenopathy. DIFFERENTIAL DIAGNOSIS/ MDM:     COVID-19, URI, strep pharyngitis, influenza    DIAGNOSTIC RESULTS         LABS:  Results for orders placed or performed during the hospital encounter of 12/10/21   Rapid influenza A/B antigens    Specimen: Nasopharyngeal Swab   Result Value Ref Range    Specimen Description . NASOPHARYNGEAL SWAB     Special Requests NOT REPORTED     Direct Exam       NEGATIVE for Influenza A + B antigens. PCR testing to confirm this result is available upon request.  Specimen will be saved in the laboratory for 7 days. Please call [phone] if PCR testing is indicated. Strep Screen Group A Throat    Specimen: Throat   Result Value Ref Range    Specimen Description . THROAT     Special Requests NOT REPORTED     Direct Exam       Rapid Strep A negative. A negative Rapid Group A Strep Screen result does not rule out the possibility of Group A Streptococci in the specimen. A Group A Strep DNA test is available upon request.   COVID-19    Specimen: Nasopharyngeal Swab   Result Value Ref Range    SARS-CoV-2          Source . NASOPHARYNGEAL SWAB     SARS-CoV-2 Not Detected Not Detected         EMERGENCY DEPARTMENT COURSE:   Vitals:    Vitals:    12/10/21 1738   BP: (!) 146/83   Pulse: 81   Resp: 16   Temp: 98.1 °F (36.7 °C)   TempSrc: Oral   SpO2: 98%   Weight: 87.5 kg (193 lb)   Height: 5' 6\" (1.676 m)     -------------------------  BP: (!) 146/83, Temp: 98.1 °F (36.7 °C), Pulse: 81, Resp: 16      Re-evaluation Notes    The patient is negative for flu and strep. He was given a note to stay off work until he has the results of his Covid swab. He is discharged in good condition. FINAL IMPRESSION      1. Acute upper respiratory infection    2.  Suspected COVID-19 virus infection          DISPOSITION/PLAN   DISPOSITION Decision To Discharge 12/10/2021 06:52:19 PM      Condition on Disposition    good    PATIENT REFERRED TO:  JULIANNA Barber [facility]  [address]  [phone]    In 1 week  As needed      DISCHARGE MEDICATIONS:  Discharge Medication List as of 12/10/2021  6:56 PM      START taking these medications    Details   predniSONE (DELTASONE) 10 MG tablet Take 4 tablets by mouth once daily for 5 days, Disp-20 tablet, R-0Normal      Dextromethorphan-guaiFENesin (CORICIDIN HBP CONGESTION/COUGH)  MG CAPS Take 1 capsule by mouth every 4 hours as needed (cough and congestion), Disp-30 capsule, R-0Normal             (Please note that portions of this note were completed with a voice recognition program.  Efforts were made to edit the dictations but occasionally words are mis-transcribed.)    Carmen Canales MD,, MD   Attending Emergency Physician       Brant Chicas MD  12/14/21 6633

## 2021-12-11 LAB
SARS-COV-2: NORMAL
SARS-COV-2: NOT DETECTED
SOURCE: NORMAL

## 2023-01-09 ENCOUNTER — HOSPITAL ENCOUNTER (EMERGENCY)
Facility: CLINIC | Age: 68
Discharge: HOME | End: 2023-01-10
Attending: SPECIALIST
Payer: MEDICARE

## 2023-01-09 DIAGNOSIS — R07.9 CHEST PAIN, UNSPECIFIED TYPE: Primary | ICD-10-CM

## 2023-01-09 DIAGNOSIS — F41.1 ANXIETY STATE: ICD-10-CM

## 2023-01-09 PROCEDURE — 99285 EMERGENCY DEPT VISIT HI MDM: CPT

## 2023-01-09 RX ORDER — TAMSULOSIN HYDROCHLORIDE 0.4 MG/1
0.4 CAPSULE ORAL DAILY
COMMUNITY

## 2023-01-09 RX ORDER — ATORVASTATIN CALCIUM 20 MG/1
20 TABLET, FILM COATED ORAL DAILY
COMMUNITY

## 2023-01-09 ASSESSMENT — PAIN - FUNCTIONAL ASSESSMENT: PAIN_FUNCTIONAL_ASSESSMENT: NONE - DENIES PAIN

## 2023-01-09 NOTE — LETTER
[facility] ED  [address] [facility]  Phone: [phone]             January 10, 2023    Patient: Everette Fatima   YOB: 1955   Date of Visit: 1/9/2023       To Whom It May Concern:    Suhail Sharma was seen and treated in our emergency department on 1/9/2023.  He may return to work on 1-11-23      Sincerely,             Signature:__________________________________

## 2023-01-10 ENCOUNTER — APPOINTMENT (OUTPATIENT)
Dept: GENERAL RADIOLOGY | Facility: CLINIC | Age: 68
End: 2023-01-10
Payer: MEDICARE

## 2023-01-10 VITALS
TEMPERATURE: 98.3 F | DIASTOLIC BLOOD PRESSURE: 88 MMHG | HEART RATE: 73 BPM | HEIGHT: 66 IN | RESPIRATION RATE: 15 BRPM | BODY MASS INDEX: 32.95 KG/M2 | SYSTOLIC BLOOD PRESSURE: 139 MMHG | OXYGEN SATURATION: 96 % | WEIGHT: 205 LBS

## 2023-01-10 LAB
EKG ATRIAL RATE: 79 BPM
EKG P AXIS: -23 DEGREES
EKG P-R INTERVAL: 208 MS
EKG Q-T INTERVAL: 368 MS
EKG QRS DURATION: 88 MS
EKG QTC CALCULATION (BAZETT): 421 MS
EKG R AXIS: -40 DEGREES
EKG T AXIS: 19 DEGREES
EKG VENTRICULAR RATE: 79 BPM
TROPONIN, HIGH SENSITIVITY: 9 NG/L (ref 0–22)

## 2023-01-10 PROCEDURE — 6370000000 HC RX 637 (ALT 250 FOR IP): Performed by: SPECIALIST

## 2023-01-10 PROCEDURE — 84484 ASSAY OF TROPONIN QUANT: CPT

## 2023-01-10 PROCEDURE — 71045 X-RAY EXAM CHEST 1 VIEW: CPT

## 2023-01-10 PROCEDURE — 93005 ELECTROCARDIOGRAM TRACING: CPT | Performed by: SPECIALIST

## 2023-01-10 RX ORDER — LORAZEPAM 1 MG/1
1 TABLET ORAL NIGHTLY PRN
Qty: 10 TABLET | Refills: 0 | Status: SHIPPED | OUTPATIENT
Start: 2023-01-10 | End: 2023-02-09

## 2023-01-10 RX ORDER — ASPIRIN 81 MG/1
324 TABLET, CHEWABLE ORAL ONCE
Status: COMPLETED | OUTPATIENT
Start: 2023-01-10 | End: 2023-01-10

## 2023-01-10 RX ADMIN — ASPIRIN 324 MG: 81 TABLET, CHEWABLE ORAL at 00:53

## 2023-01-10 ASSESSMENT — ENCOUNTER SYMPTOMS
SHORTNESS OF BREATH: 1
ABDOMINAL PAIN: 0
COUGH: 0
CHEST TIGHTNESS: 1

## 2023-01-10 NOTE — ED PROVIDER NOTES
[facility]  [facility]  Phone: [phone]      Pt Name: Candice Nyhan  MRN: [id number]  [place] 1955  Date of evaluation: 1/9/2023      CHIEF COMPLAINT       Chief Complaint   Patient presents with    Anxiety         HISTORY OF PRESENT ILLNESS    Candice Nyhan is a 78 y.o. male who presents   Chief Complaint   Patient presents with    Anxiety   . 79-year-old male patient presents to the emergency department feeling very anxious and is stressed out. Patient states he has some issues with his neighbor who has been banging on the walls inside his trailer which is next to the patient's house and patient is unable to sleep. He is a  and is not getting enough sleep and is causing him to become very anxious and stressed out. He also has been having chest tightness and shortness of breath which he attributes to anxiety/stress. He denies any nausea, vomiting, lightheadedness, dizziness, palpitations or diaphoresis. Upon arrival, patient is pain-free. He has history of anxiety disorder and used to take trazodone in the past.     REVIEW OF SYSTEMS     Review of Systems   Constitutional:  Negative for diaphoresis, fatigue and fever. Respiratory:  Positive for chest tightness and shortness of breath. Negative for cough. Cardiovascular:  Negative for palpitations and leg swelling. Gastrointestinal:  Negative for abdominal pain. Neurological:  Negative for dizziness, light-headedness and headaches. Psychiatric/Behavioral:  Positive for sleep disturbance. Negative for suicidal ideas. All other systems reviewed and are negative. PAST MEDICAL HISTORY    has a past medical history of Hypertension. SURGICAL HISTORY      has a past surgical history that includes pacemaker placement; shoulder surgery; knee surgery; and Colonoscopy.     CURRENT MEDICATIONS       Discharge Medication List as of 1/10/2023  2:26 AM        CONTINUE these medications which have NOT CHANGED    Details   tamsulosin (FLOMAX) 0.4 MG capsule Take 0.4 mg by mouth dailyHistorical Med      atorvastatin (LIPITOR) 20 MG tablet Take 20 mg by mouth dailyHistorical Med      Dextromethorphan-guaiFENesin (CORICIDIN HBP CONGESTION/COUGH)  MG CAPS Take 1 capsule by mouth every 4 hours as needed (cough and congestion), Disp-30 capsule, R-0Normal      SPIRONOLACTONE-HCTZ PO Take by mouth Dose unknownHistorical Med      metoprolol tartrate (LOPRESSOR) 50 MG tablet Take 50 mg by mouth 2 times dailyHistorical Med      omeprazole (PRILOSEC) 20 MG delayed release capsule Take 20 mg by mouth dailyHistorical Med      acetaminophen (TYLENOL) 500 MG tablet Take 1,000 mg by mouth dailyHistorical Med      ketorolac (TORADOL) 10 MG tablet Take 1 tablet by mouth every 6 hours as needed for Pain, Disp-20 tablet, R-0Print             ALLERGIES     is allergic to demerol hcl [meperidine] and morphine. FAMILY HISTORY     has no family status information on file. family history is not on file. SOCIAL HISTORY      reports that he has never smoked. He has never used smokeless tobacco. He reports that he does not currently use drugs after having used the following drugs: Marijuana Deadra Ludwin). He reports that he does not drink alcohol. PHYSICAL EXAM     INITIAL VITALS:  height is 5' 6\" (1.676 m) and weight is 93 kg (205 lb). His oral temperature is 98.3 °F (36.8 °C). His blood pressure is 139/88 and his pulse is 73. His respiration is 15 and oxygen saturation is 96%. Physical Exam  Vitals and nursing note reviewed. Constitutional:       General: He is not in acute distress. Appearance: He is well-developed. HENT:      Head: Normocephalic and atraumatic. Nose: Nose normal.   Eyes:      Extraocular Movements: Extraocular movements intact. Pupils: Pupils are equal, round, and reactive to light. Cardiovascular:      Rate and Rhythm: Normal rate and regular rhythm.       Heart sounds: Normal heart sounds. No murmur heard. Pulmonary:      Effort: Pulmonary effort is normal. No respiratory distress. Breath sounds: Normal breath sounds. Abdominal:      General: Bowel sounds are normal. There is no distension. Palpations: Abdomen is soft. Tenderness: There is no abdominal tenderness. Musculoskeletal:      Cervical back: Normal range of motion and neck supple. Skin:     General: Skin is warm and dry. Neurological:      General: No focal deficit present. Mental Status: He is alert and oriented to person, place, and time. Psychiatric:         Behavior: Behavior normal.         Thought Content: Thought content normal.         DIFFERENTIAL DIAGNOSIS/ MDM:     Differential diagnosis considered: Bronchitis, Pneumonia, ACS, PE, Musculoskeletal pain, pneumothorax, thoracic aortic dissection, nonspecific chest pain, anxiety state, gastrointestinal in origin     Chronic Conditions affecting care (DM,HTN,CA, etc): Hypertension, anxiety disorder    Social Determinants of Health affecting care (unable to care for self, lives alone, unemployed, homeless,etc): Patient lives at home and is able to take care of himself    History source(s) (patient,spouse,parent,family,friend,EMS,etc): Patient    Review of external sources (ECF,Hospital records,EMS report, radiology reports, etc): None    Tests considered but not ordered: Not applicable    Independent interpretation of tests (eg.  X-ray, CAT scan, Doppler studies, EKG): EKG    Discussion of x-ray results with radiology: Not required    Consults: None obtained    Consideration for admission/observation (even if discharged):  Observation admission considered    Prescription considerations: Patient was prescribed Ativan    Sepsis considered: No evidence of bacteremia or sepsis    Critical Care note written: None    DIAGNOSTIC RESULTS     EKG: All EKG's are interpreted by the Emergency Department Physician who either signs or Co-signs this chart in the absence of a cardiologist.    EKG Interpretation    Interpreted by emergency department physician    Rhythm: paced  Rate: normal  Axis: left  Ectopy: none  Conduction: paced  ST Segments: nonspecific changes  T Waves: nonspecific changes  Clinical Impression: paced rhythm, No other abnormalities discernible through this rhythm     RADIOLOGY:   I reviewed the radiologist interpretations:  XR CHEST PORTABLE   Final Result   Normal examination with pacemaker in place. .             XR CHEST PORTABLE    Result Date: 1/10/2023  EXAMINATION: ONE XRAY VIEW OF THE CHEST 1/10/2023 12:49 am COMPARISON: 05/09/2020 HISTORY: ORDERING SYSTEM PROVIDED HISTORY: Chest tightness TECHNOLOGIST PROVIDED HISTORY: Chest tightness Reason for Exam: Chest tightness, anxiety FINDINGS: Heart size and pulmonary vasculature are normal.  A dual lead pacemaker is in place. The lungs are clear and normally expanded. Surrounding osseous and soft tissue structures are unremarkable. Normal examination with pacemaker in place. Meera Frost LABS:  Labs Reviewed   TROPONIN       Troponin is level is normal    EMERGENCY DEPARTMENT COURSE:   Vitals:    Vitals:    01/09/23 2320 01/10/23 0054 01/10/23 0220   BP: (!) 187/99 (!) 160/102 139/88   Pulse: 86 84 73   Resp: 16 15 15   Temp: 98.3 °F (36.8 °C)     TempSrc: Oral     SpO2: 98% 98% 96%   Weight: 93 kg (205 lb)     Height: 5' 6\" (1.676 m)       -------------------------  BP: 139/88, Temp: 98.3 °F (36.8 °C), Heart Rate: 73, Resp: 15    Orders Placed This Encounter   Medications    aspirin chewable tablet 324 mg    LORazepam (ATIVAN) 1 MG tablet     Sig: Take 1 tablet by mouth nightly as needed for Anxiety for up to 30 days. Max Daily Amount: 1 mg     Dispense:  10 tablet     Refill:  0       During the emergency department course, patient was given aspirin 324 mg orally and was put on the monitor. Patient remained pain-free throughout the ED course.   Plan is to discharge the patient with instructions drink plenty of oral fluids, continue current medications, Ativan 1 mg nightly as needed for the anxiety, follow-up with PCP, return if worse. I have reviewed the disposition diagnosis with the patient and or their family/guardian. I have answered their questions and given discharge instructions. They voiced understanding of these instructions and did not have any further questions or complaints. Re-evaluation Notes    Patient is resting comfortably and does not appear to be in any pain or distress prior to discharge      PROCEDURES:  None    FINAL IMPRESSION      1. Chest pain, unspecified type    2. Anxiety state          DISPOSITION/PLAN   DISPOSITION Decision To Discharge 01/10/2023 02:25:07 AM      Condition on Disposition    Stable    PATIENT REFERRED TO:  Gianni Villa APRN - CNP  [address] [facility]  [phone]    Call in 2 days  For reevaluation of current symptoms    [facility] ED  [facility]  [phone]    If symptoms worsen      DISCHARGE MEDICATIONS:  Discharge Medication List as of 1/10/2023  2:26 AM        START taking these medications    Details   LORazepam (ATIVAN) 1 MG tablet Take 1 tablet by mouth nightly as needed for Anxiety for up to 30 days. Max Daily Amount: 1 mg, Disp-10 tablet, R-0Print             (Please note that portions of this note were completed with a voice recognition program.  Efforts were made to edit the dictations but occasionally words are mis-transcribed.)    Omer Jules MD,, MD, F.A.C.E.P.   Attending Emergency Physician      Omer Jules MD  01/10/23 4703

## 2023-01-10 NOTE — ED NOTES
Pt presents to the ED via private auto.  Pt c/o anxiety due to a situation with a neighbor     Edmond Burris RN  01/09/23 1842

## 2023-01-10 NOTE — DISCHARGE INSTRUCTIONS
Please understand that at this time there is no evidence for a more serious underlying process, but that early in the process of an illness or injury, an emergency department workup can be falsely reassuring. You should contact your family doctor within the next 48 hours for a follow up appointment    Timi Maria!!!    From [facility] ([facility]) and [address] [place] [address] Emergency Services    On behalf of the Emergency Department staff at [facility]), I would like to thank you for giving us the opportunity to address your health care needs and concerns. We hope that during your visit, our service was delivered in a professional and caring manner. Please keep [facility] ([facility]) in mind as we walk with you down the path to your own personal wellness. Please expect an automated text message or email from us so we can ask a few questions about your health and progress. Based on your answers, a clinician may call you back to offer help and instructions. Please understand that early in the process of an illness or injury, an emergency department workup can be falsely reassuring. If you notice any worsening, changing or persistent symptoms please call your family doctor or return to the ER immediately. Tell us how we did during your visit at [email or web address]   and let us know about your experience

## 2024-07-16 ENCOUNTER — HOSPITAL ENCOUNTER (OUTPATIENT)
Age: 69
Setting detail: SPECIMEN
Discharge: HOME | End: 2024-07-16

## 2024-07-16 LAB
ANION GAP SERPL CALCULATED.3IONS-SCNC: 10 MMOL/L (ref 9–16)
BASOPHILS # BLD: 0.05 K/UL (ref 0–0.2)
BASOPHILS NFR BLD: 1 % (ref 0–2)
BUN SERPL-MCNC: 12 MG/DL (ref 8–23)
CALCIUM SERPL-MCNC: 9.8 MG/DL (ref 8.6–10.4)
CHLORIDE SERPL-SCNC: 107 MMOL/L (ref 98–107)
CO2 SERPL-SCNC: 24 MMOL/L (ref 20–31)
CREAT SERPL-MCNC: 0.9 MG/DL (ref 0.7–1.2)
EOSINOPHIL # BLD: 0.18 K/UL (ref 0–0.44)
EOSINOPHILS RELATIVE PERCENT: 3 % (ref 1–4)
ERYTHROCYTE [DISTWIDTH] IN BLOOD BY AUTOMATED COUNT: 13.6 % (ref 11.8–14.4)
GFR, ESTIMATED: >90 ML/MIN/1.73M2
GLUCOSE SERPL-MCNC: 85 MG/DL (ref 74–99)
HCT VFR BLD AUTO: 42.7 % (ref 40.7–50.3)
HGB BLD-MCNC: 14.7 G/DL (ref 13–17)
IMM GRANULOCYTES # BLD AUTO: <0.03 K/UL (ref 0–0.3)
IMM GRANULOCYTES NFR BLD: 0 %
INR PPP: 1
LYMPHOCYTES NFR BLD: 2.18 K/UL (ref 1.1–3.7)
LYMPHOCYTES RELATIVE PERCENT: 34 % (ref 24–43)
MCH RBC QN AUTO: 31.3 PG (ref 25.2–33.5)
MCHC RBC AUTO-ENTMCNC: 34.4 G/DL (ref 28.4–34.8)
MCV RBC AUTO: 91 FL (ref 82.6–102.9)
MONOCYTES NFR BLD: 0.55 K/UL (ref 0.1–1.2)
MONOCYTES NFR BLD: 9 % (ref 3–12)
NEUTROPHILS NFR BLD: 53 % (ref 36–65)
NEUTS SEG NFR BLD: 3.37 K/UL (ref 1.5–8.1)
NRBC BLD-RTO: 0 PER 100 WBC
PLATELET # BLD AUTO: 184 K/UL (ref 138–453)
PMV BLD AUTO: 11.5 FL (ref 8.1–13.5)
POTASSIUM SERPL-SCNC: 3.9 MMOL/L (ref 3.7–5.3)
PROTHROMBIN TIME: 13 SEC (ref 11.7–14.9)
RBC # BLD AUTO: 4.69 M/UL (ref 4.21–5.77)
SODIUM SERPL-SCNC: 141 MMOL/L (ref 136–145)
WBC OTHER # BLD: 6.4 K/UL (ref 3.5–11.3)

## 2024-07-19 RX ORDER — MULTIVIT-MIN/IRON/FOLIC ACID/K 18-600-40
1 CAPSULE ORAL DAILY
COMMUNITY

## 2024-07-19 RX ORDER — LANOLIN ALCOHOL/MO/W.PET/CERES
1000 CREAM (GRAM) TOPICAL DAILY
COMMUNITY

## 2024-07-19 RX ORDER — IBUPROFEN 200 MG
600 TABLET ORAL 2 TIMES DAILY
COMMUNITY

## 2024-07-19 RX ORDER — VALACYCLOVIR HYDROCHLORIDE 1 G/1
1000 TABLET, FILM COATED ORAL DAILY PRN
COMMUNITY

## 2024-07-22 ENCOUNTER — HOSPITAL ENCOUNTER (OUTPATIENT)
Age: 69
Setting detail: OUTPATIENT SURGERY
Discharge: HOME | End: 2024-07-22
Attending: INTERNAL MEDICINE | Admitting: INTERNAL MEDICINE
Payer: MEDICARE

## 2024-07-22 VITALS
SYSTOLIC BLOOD PRESSURE: 139 MMHG | TEMPERATURE: 96.8 F | WEIGHT: 189 LBS | RESPIRATION RATE: 16 BRPM | BODY MASS INDEX: 30.37 KG/M2 | HEART RATE: 64 BPM | HEIGHT: 66 IN | DIASTOLIC BLOOD PRESSURE: 94 MMHG | OXYGEN SATURATION: 97 %

## 2024-07-22 DIAGNOSIS — Z45.010 PACEMAKER AT END OF BATTERY LIFE: ICD-10-CM

## 2024-07-22 LAB — ECHO BSA: 2 M2

## 2024-07-22 PROCEDURE — 7100000010 HC PHASE II RECOVERY - FIRST 15 MIN: Performed by: INTERNAL MEDICINE

## 2024-07-22 PROCEDURE — 2709999900 HC NON-CHARGEABLE SUPPLY: Performed by: INTERNAL MEDICINE

## 2024-07-22 PROCEDURE — 2500000003 HC RX 250 WO HCPCS: Performed by: INTERNAL MEDICINE

## 2024-07-22 PROCEDURE — 99153 MOD SED SAME PHYS/QHP EA: CPT | Performed by: INTERNAL MEDICINE

## 2024-07-22 PROCEDURE — 6360000002 HC RX W HCPCS: Performed by: INTERNAL MEDICINE

## 2024-07-22 PROCEDURE — C1892 INTRO/SHEATH,FIXED,PEEL-AWAY: HCPCS | Performed by: INTERNAL MEDICINE

## 2024-07-22 PROCEDURE — 99152 MOD SED SAME PHYS/QHP 5/>YRS: CPT | Performed by: INTERNAL MEDICINE

## 2024-07-22 PROCEDURE — 2580000003 HC RX 258: Performed by: INTERNAL MEDICINE

## 2024-07-22 PROCEDURE — 7100000011 HC PHASE II RECOVERY - ADDTL 15 MIN: Performed by: INTERNAL MEDICINE

## 2024-07-22 PROCEDURE — A4217 STERILE WATER/SALINE, 500 ML: HCPCS | Performed by: INTERNAL MEDICINE

## 2024-07-22 PROCEDURE — C1785 PMKR, DUAL, RATE-RESP: HCPCS | Performed by: INTERNAL MEDICINE

## 2024-07-22 RX ORDER — ONDANSETRON 2 MG/ML
4 INJECTION INTRAMUSCULAR; INTRAVENOUS EVERY 6 HOURS PRN
Status: DISCONTINUED | OUTPATIENT
Start: 2024-07-22 | End: 2024-07-22 | Stop reason: HOSPADM

## 2024-07-22 RX ORDER — SODIUM CHLORIDE 0.9 % (FLUSH) 0.9 %
5-40 SYRINGE (ML) INJECTION PRN
Status: DISCONTINUED | OUTPATIENT
Start: 2024-07-22 | End: 2024-07-22 | Stop reason: HOSPADM

## 2024-07-22 RX ORDER — SODIUM CHLORIDE 9 MG/ML
INJECTION, SOLUTION INTRAVENOUS PRN
Status: DISCONTINUED | OUTPATIENT
Start: 2024-07-22 | End: 2024-07-22 | Stop reason: HOSPADM

## 2024-07-22 RX ORDER — ONDANSETRON 4 MG/1
4 TABLET, ORALLY DISINTEGRATING ORAL EVERY 8 HOURS PRN
Status: DISCONTINUED | OUTPATIENT
Start: 2024-07-22 | End: 2024-07-22 | Stop reason: HOSPADM

## 2024-07-22 RX ORDER — SODIUM CHLORIDE 9 MG/ML
INJECTION, SOLUTION INTRAVENOUS CONTINUOUS
Status: DISCONTINUED | OUTPATIENT
Start: 2024-07-22 | End: 2024-07-22 | Stop reason: HOSPADM

## 2024-07-22 RX ORDER — MIDAZOLAM HYDROCHLORIDE 1 MG/ML
INJECTION INTRAMUSCULAR; INTRAVENOUS PRN
Status: DISCONTINUED | OUTPATIENT
Start: 2024-07-22 | End: 2024-07-22 | Stop reason: HOSPADM

## 2024-07-22 RX ORDER — FENTANYL CITRATE 50 UG/ML
INJECTION, SOLUTION INTRAMUSCULAR; INTRAVENOUS PRN
Status: DISCONTINUED | OUTPATIENT
Start: 2024-07-22 | End: 2024-07-22 | Stop reason: HOSPADM

## 2024-07-22 RX ORDER — SODIUM CHLORIDE 0.9 % (FLUSH) 0.9 %
5-40 SYRINGE (ML) INJECTION EVERY 12 HOURS SCHEDULED
Status: DISCONTINUED | OUTPATIENT
Start: 2024-07-22 | End: 2024-07-22 | Stop reason: HOSPADM

## 2024-07-22 RX ORDER — LIDOCAINE HYDROCHLORIDE 10 MG/ML
INJECTION, SOLUTION INFILTRATION; PERINEURAL PRN
Status: DISCONTINUED | OUTPATIENT
Start: 2024-07-22 | End: 2024-07-22 | Stop reason: HOSPADM

## 2024-07-22 RX ADMIN — SODIUM CHLORIDE: 9 INJECTION, SOLUTION INTRAVENOUS at 11:37

## 2024-07-22 NOTE — H&P
alert, well appearing, and in no acute distress  Mental status: oriented to person, place, and time with normal affect  Head:  normocephalic, atraumatic.  Eye: no icterus, redness, pupils equal and reactive, extraocular eye movements intact, conjunctiva clear  Ear: normal external ear, no discharge, hearing intact  Nose:  no drainage noted  Mouth: Poor dentition mucous membranes moist  Neck: supple, no carotid bruits, thyroid not palpable  Lungs: Bilateral equal air entry, clear to ausculation, no wheezing, rales or rhonchi, normal effort  Cardiovascular: Pacemaker L upper chest. HR 59 asymptomatic bradycardic rate, regular rhythm, no murmur, gallop, rub.  Abdomen: Soft, nontender, nondistended, normal bowel sounds  Neurologic: There are no new focal motor or sensory deficits, normal muscle tone and bulk, no abnormal sensation, normal speech, cranial nerves II through XII grossly intact  Skin: Scabbed small lesions to chest and bilateral arms \"from picking\" No gross rashes, bruising or bleeding on exposed skin area  Extremities: peripheral pulses palpable, no pedal edema or calf pain with palpation  Psych: normal affect     Investigations:      Laboratory Testing:  No results found for this or any previous visit (from the past 24 hour(s)).    Recent Labs     07/16/24  1455   HGB 14.7   HCT 42.7   WBC 6.4   MCV 91.0      K 3.9      CO2 24   BUN 12   CREATININE 0.9   GLUCOSE 85   INR 1.0   PROTIME 13.0       No results for input(s): \"COVID19\" in the last 720 hours.  Imaging/Diagnostics:    No results found.    Diagnosis:      Pacemaker at end of battery life    Plans:     1. Pacemaker battery change [employer]      [place] Bazan, JULIANNA - CNP  7/22/2024  11:31 AM

## 2024-07-22 NOTE — PRE SEDATION
Sedation Plan  ASA: class 2 - patient with mild systemic disease     Mallampati class: II - soft palate, uvula, fauces visible.    Sedation plan: level 2-1: moderate/analgesia (conscious sedation)    Risks, benefits, and alternatives discussed with patient.